# Patient Record
Sex: FEMALE | Race: WHITE
[De-identification: names, ages, dates, MRNs, and addresses within clinical notes are randomized per-mention and may not be internally consistent; named-entity substitution may affect disease eponyms.]

---

## 2019-03-21 ENCOUNTER — HOSPITAL ENCOUNTER (EMERGENCY)
Dept: HOSPITAL 89 - ER | Age: 64
Discharge: HOME | End: 2019-03-21
Payer: SELF-PAY

## 2019-03-21 VITALS — DIASTOLIC BLOOD PRESSURE: 75 MMHG | SYSTOLIC BLOOD PRESSURE: 97 MMHG

## 2019-03-21 DIAGNOSIS — K81.9: Primary | ICD-10-CM

## 2019-03-21 LAB — PLATELET COUNT, AUTOMATED: 512 K/UL (ref 150–450)

## 2019-03-21 PROCEDURE — 99284 EMERGENCY DEPT VISIT MOD MDM: CPT

## 2019-03-21 PROCEDURE — 82040 ASSAY OF SERUM ALBUMIN: CPT

## 2019-03-21 PROCEDURE — 84132 ASSAY OF SERUM POTASSIUM: CPT

## 2019-03-21 PROCEDURE — 82374 ASSAY BLOOD CARBON DIOXIDE: CPT

## 2019-03-21 PROCEDURE — 83690 ASSAY OF LIPASE: CPT

## 2019-03-21 PROCEDURE — 84075 ASSAY ALKALINE PHOSPHATASE: CPT

## 2019-03-21 PROCEDURE — 82565 ASSAY OF CREATININE: CPT

## 2019-03-21 PROCEDURE — 82435 ASSAY OF BLOOD CHLORIDE: CPT

## 2019-03-21 PROCEDURE — 84460 ALANINE AMINO (ALT) (SGPT): CPT

## 2019-03-21 PROCEDURE — 84520 ASSAY OF UREA NITROGEN: CPT

## 2019-03-21 PROCEDURE — 86677 HELICOBACTER PYLORI ANTIBODY: CPT

## 2019-03-21 PROCEDURE — 74177 CT ABD & PELVIS W/CONTRAST: CPT

## 2019-03-21 PROCEDURE — 93005 ELECTROCARDIOGRAM TRACING: CPT

## 2019-03-21 PROCEDURE — 85025 COMPLETE CBC W/AUTO DIFF WBC: CPT

## 2019-03-21 PROCEDURE — 96361 HYDRATE IV INFUSION ADD-ON: CPT

## 2019-03-21 PROCEDURE — 96365 THER/PROPH/DIAG IV INF INIT: CPT

## 2019-03-21 PROCEDURE — 84450 TRANSFERASE (AST) (SGOT): CPT

## 2019-03-21 PROCEDURE — 87088 URINE BACTERIA CULTURE: CPT

## 2019-03-21 PROCEDURE — 82247 BILIRUBIN TOTAL: CPT

## 2019-03-21 PROCEDURE — 82310 ASSAY OF CALCIUM: CPT

## 2019-03-21 PROCEDURE — 84155 ASSAY OF PROTEIN SERUM: CPT

## 2019-03-21 PROCEDURE — 82947 ASSAY GLUCOSE BLOOD QUANT: CPT

## 2019-03-21 PROCEDURE — 84295 ASSAY OF SERUM SODIUM: CPT

## 2019-03-21 PROCEDURE — 81001 URINALYSIS AUTO W/SCOPE: CPT

## 2019-03-21 PROCEDURE — 96375 TX/PRO/DX INJ NEW DRUG ADDON: CPT

## 2019-03-21 NOTE — RADIOLOGY IMAGING REPORT
FACILITY: [facility] - [place] 

 

PATIENT NAME: Conchita Johnson

: 1955

MR: [id number]

V: [id number]

EXAM DATE: 

ORDERING PHYSICIAN: BURKE JOHNSON

TECHNOLOGIST: 

 

Location: [facility]

Patient: Conchita Johnson

: 1955

MRN: [id number]

Visit/Account:[id number]

Date of Sevice:  3/21/2019

 

ACCESSION #: [id number]

 

EXAMINATION: CT abdomen and pelvis with IV contrast

 

HISTORY:  Abdominal pain.

 

TECHNIQUE:   Axial CT images of the abdomen and pelvis were obtained with IV contrast, with coronal a
nd sagittal 2D reconstructed images.

One of the following dose optimization techniques was utilized in the performance of this exam: Autom
ated exposure control; adjustment of the mA and/or kV according to the patient's size; or use of an i
terative  reconstruction technique.  Specific details can be referenced in the facility's radiology C
T exam operational policy.

 

Contrast:  90 mL of IV Isovue-370.

 

COMPARISON:  None.

 

FINDINGS:

Liver:  Negative.

 

Gallbladder and bile ducts:  The gallbladder is significantly distended measuring up to 5.4 cm in matt
meter and approximately 11.5 cm in length with wall thickening and enhancement and mild pericholecyst
ic stranding. CT appearance is suspicious for acute cholecystitis. No significant bile duct dilatatio
n by CT.

 

Spleen:  Negative.

 

Pancreas:  Negative.

 

Adrenal glands:  Negative.

 

Kidneys:  Negative. No hydronephrosis or urinary calculi.

 

Bowel and peritoneum:  The small bowel and colon are normal in caliber, without evidence of obstructi
on or any focal inflammatory process. Unremarkable appendix. No free fluid or free intraperitoneal ai
r. Small hiatal hernia.

 

Pelvic  structures:  Lobular uterus with likely fibroids.

 

Lymph node assessment:  Negative.

 

Vessels:  Mild vascular calcifications. Normal caliber abdominal aorta. The IVC is patent.

 

Musculoskeletal:   No acute osseous findings. Multilevel degenerative changes along the spine.

 

Body wall:  Negative.

 

Lung bases:  Negative.

 

IMPRESSION:

1. The gallbladder is distended with wall thickening and mild pericholecystic stranding, suspicious f
or acute cholecystitis.

2. No other acute intra-abdominal findings.

3. Normal appendix.

4. Likely uterine fibroids.

5. Small hiatal hernia.

 

 

Report Dictated By: Jae Moore MD at 3/21/2019 1:08 PM

 

Report E-Signed By: Jae Moore MD  at 3/21/2019 1:19 PM

 

WSN:[id number]

## 2019-03-21 NOTE — EKG
[place] [facility] [place] 

 

PATIENT NAME: CLAIR BURK

: [id number]

MR: [id number]

V: [id number]

EXAM DATE: 

ORDERING PHYSICIAN: BURKE JOHNSON

TECHNOLOGIST: 

 

Test Reason : ab pain

Blood Pressure : ***/*** mmHG

Vent. Rate : 078 BPM     Atrial Rate : 078 BPM

   P-R Int : 128 ms          QRS Dur : 076 ms

    QT Int : 388 ms       P-R-T Axes : 040 056 059 degrees

   QTc Int : 442 ms

 

Normal sinus rhythm

Septal infarct , age undetermined

Abnormal ECG

No previous ECGs available

Confirmed by CHEPE MANJARREZ ([id number]) on 3/21/2019 2:22:17 PM

 

Referred By:             Confirmed By:CHEPE MANJARREZ

## 2019-03-21 NOTE — ER REPORT
History and Physical


Time Seen By MD:  11:21


HPI/ROS


CHIEF COMPLAINT: Stomach and kidney pain





HISTORY OF PRESENT ILLNESS: Patient is a 63-year-old female who admits to no 


known medical history although she does not follow with a primary physician. She


states that for the last few weeks she's had abdominal pain as well as bilateral


flank pain. She thinks she may have "a kidney infection". She denies that she is


having any frequency of urination or burning with urination. He denies any 


significant past surgical history. She states that she just "feels ill". Patient


denies fevers or chills. She denies any chest pain or shortness of breath.





REVIEW OF SYSTEMS:


Constitutional: No fever, no chills.


Eyes: No discharge.


ENT: No sore throat. 


Cardiovascular: No chest pain, no palpitations.


Respiratory: No cough, no shortness of breath.


Gastrointestinal: Abdominal and flank pain with occasional nausea as well as 


anorexia


Genitourinary: No hematuria. Denies frequency of urination


Musculoskeletal: No back pain.


Skin: No rashes.


Neurological: No headache.


Allergies:  


Coded Allergies:  


     acetaminophen (Verified  Allergy, Mild, swelling in limbs, 3/21/19)


Uncoded Allergies:  


     wood grain alcohol (Allergy, Unknown, 3/21/19)


Home Meds


Active Scripts


Ondansetron Hcl (ZOFRAN) 4 Mg Tablet, 4 MG PO Q8H for Nausea, #15 TAB 0 Refills


   Prov:BURKE JOHNSON MD         3/21/19


Tramadol Hcl (TRAMADOL HCL) 50 Mg Tablet, 50 MG PO Q6H PRN for PAIN, #20 TAB 0 


Refills


   Prov:BURKE JOHNSON MD         3/21/19


Amoxicillin/Pot Clav 875-125 Mg Tab (AUGMENTIN 875-125 TABLET) 1 Each Tablet, 1 


TAB PO Q12H for 7 Days, #14 TAB 0 Refills


   Prov:BURKE JOHNSON MD         3/21/19


Past Medical/Surgical History


Patient reports no known past medical history but does not routinely follow with


a physician.


Constitutional





Vital Sign - Last 24 Hours








 3/21/19 3/21/19 3/21/19 3/21/19





 11:33 11:38 12:00 13:00


 


Temp  97.5  


 


Pulse  85 78 82


 


Resp  24 30 20


 


B/P (MAP) 158/125 (136) 158/125  166/96 (119)


 


Pulse Ox  94 95 91


 


O2 Delivery  Room Air  


 


    





 3/21/19 3/21/19 3/21/19 3/21/19





 13:30 14:00 14:30 15:00


 


Pulse 84 94 86 80


 


Resp 17 21 24 18


 


B/P (MAP) 161/94 (116) 156/98 (117) 157/92 (113) 179/106 (130)


 


Pulse Ox 90 91 93 94





 3/21/19   





 15:30   


 


Pulse 81   


 


Resp 23   


 


B/P (MAP) 97/75 (82)   


 


Pulse Ox 91   














Intake and Output   


 


 3/21/19 3/21/19 3/22/19





 15:00 23:00 07:00


 


Intake Total 1000 ml 100 ml 


 


Balance 1000 ml 100 ml 








Physical Exam


General/Constitutional: Patient is awake, alert, ill-appearing but nontoxic 


sitting upright for comfort


Head: Normocephalic and atraumatic.


Eyes: Conjunctival clear, Pupils are equal and reactive to light. Extraocular 


muscles are intact and symmetrical. Sclera are clear and anicteric.


Ears:External canals are clear. Tympanic membranes are clear with normal 


landmarks and light reflex.


Nares: No rhinorrhea or bleeding. Turbinates are pink and moist.


Oropharyngeal: Mucous membranes are moist. There is no pharyngeal erythema or 


exudate. There are no palatal petechiae. Uvula is midline and symmetrical. 


Neck: Supple, no adenopathy.


Cardiovascular: Heart is regular rate and rhythm without audible murmurs, rubs 


or gallops. 


Pulmonary: Lungs are clear to auscultation bilaterally. There are no wheezes, 


rales, or rhonchi. Chest rise is symmetrical


Abdomen: Patient is diffusely tender throughout abdomen bilateral flanks. No 


obvious peritoneal signs elicited


Extremities: No gross deformities, No peripheral cyanosis. Able to move all 4 


extremities.


Neuro: Alert and oriented X3, 


Skin: No rashes, skin is warm dry and well perfused.





Medical Decision Making


Data Points


Result Diagram:  


3/21/19 1151                                                                    


           3/21/19 1151





Laboratory





Hematology








Test


 3/21/19


11:51


 


Red Blood Count


 5.42 M/uL


(4.17-5.56)


 


Mean Corpuscular Volume


 87.4 fL


(80.0-96.0)


 


Mean Corpuscular Hemoglobin


 30.1 pg


(26.0-33.0)


 


Mean Corpuscular Hemoglobin


Concent 34.4 g/dL


(32.0-36.0)


 


Red Cell Distribution Width


 13.4 %


(11.5-14.5)


 


Mean Platelet Volume


 6.9 fL


(7.2-11.1)


 


Neutrophils (%) (Auto)


 77.7 %


(39.4-72.5)


 


Lymphocytes (%) (Auto)


 12.6 %


(17.6-49.6)


 


Monocytes (%) (Auto)


 8.2 %


(4.1-12.4)


 


Eosinophils (%) (Auto)


 1.1 %


(0.4-6.7)


 


Basophils (%) (Auto)


 0.4 %


(0.3-1.4)


 


Nucleated RBC Relative Count


(auto) 0.0 /100WBC 





 


Neutrophils # (Auto)


 8.3 K/uL


(2.0-7.4)


 


Lymphocytes # (Auto)


 1.4 K/uL


(1.3-3.6)


 


Monocytes # (Auto)


 0.9 K/uL


(0.3-1.0)


 


Eosinophils # (Auto)


 0.1 K/uL


(0.0-0.5)


 


Basophils # (Auto)


 0.0 K/uL


(0.0-0.1)


 


Nucleated RBC Absolute Count


(auto) 0.00 K/uL 





 


Peripheral Blood Smear Yes Y/N 


 


Urine Color Ida 


 


Urine Clarity Cloudy 


 


Urine pH


 5.0 pH


(4.8-9.5)


 


Urine Specific Gravity 1.021 


 


Urine Protein


 Negative mg/dL


(NEGATIVE)


 


Urine Glucose (UA)


 Negative mg/dL


(NEGATIVE)


 


Urine Ketones


 Negative mg/dL


(NEGATIVE)


 


Urine Blood


 Negative


(NEGATIVE)


 


Urine Nitrite


 Negative


(NEGATIVE)


 


Urine Bilirubin


 Negative


(NEGATIVE)


 


Urine Urobilinogen


 4.0 mg/dL


(0.2-1.9)


 


Urine Leukocyte Esterase


 Moderate


(NEGATIVE)


 


Urine RBC


 1 /HPF


(0-2/HPF)


 


Urine WBC


 8 /HPF


(0-5/HPF)


 


Urine Squamous Epithelial


Cells Many /LPF


(</=FEW)


 


Urine Transitional Epithelial


Cells Moderate /LPF


(NONE-FEW)


 


Urine Amorphous Crystals Few /HPF 


 


Urine Bacteria


 Few /HPF


(NONE-FEW)


 


Urine Mucus


 Few /HPF


(NONE-FEW)


 


Sodium Level


 141 mmol/L


(137-145)


 


Potassium Level


 3.8 mmol/L


(3.5-5.0)


 


Chloride Level


 106 mmol/L


()


 


Carbon Dioxide Level


 25 mmol/L


(22-31)


 


Blood Urea Nitrogen


 16 mg/dl


(7-18)


 


Creatinine


 0.80 mg/dl


(0.52-1.04)


 


Glomerular Filtration Rate


Calc > 60.0 





 


Random Glucose


 135 mg/dl


()


 


Calcium Level


 10.3 mg/dl


(8.4-10.2)


 


Total Bilirubin


 1.3 mg/dl


(0.2-1.3)


 


Aspartate Amino Transf


(AST/SGOT) 216 U/L (0-35) 





 


Alanine Aminotransferase


(ALT/SGPT) 119 U/L (0-56) 





 


Alkaline Phosphatase


 347 U/L


(0-126)


 


Total Protein


 8.5 g/dl


(6.3-8.2)


 


Albumin


 4.6 g/dl


(3.5-5.0)


 


Lipase


 69 U/L


()


 


Helicobacter pylori IgG


Antibody Negative


(NEGATIVE)








Chemistry








Test


 3/21/19


11:51


 


White Blood Count


 10.7 k/uL


(4.5-11.0)


 


Red Blood Count


 5.42 M/uL


(4.17-5.56)


 


Hemoglobin


 16.3 g/dL


(12.0-16.0)


 


Hematocrit


 47.3 %


(34.0-47.0)


 


Mean Corpuscular Volume


 87.4 fL


(80.0-96.0)


 


Mean Corpuscular Hemoglobin


 30.1 pg


(26.0-33.0)


 


Mean Corpuscular Hemoglobin


Concent 34.4 g/dL


(32.0-36.0)


 


Red Cell Distribution Width


 13.4 %


(11.5-14.5)


 


Platelet Count


 512 K/uL


(150-450)


 


Mean Platelet Volume


 6.9 fL


(7.2-11.1)


 


Neutrophils (%) (Auto)


 77.7 %


(39.4-72.5)


 


Lymphocytes (%) (Auto)


 12.6 %


(17.6-49.6)


 


Monocytes (%) (Auto)


 8.2 %


(4.1-12.4)


 


Eosinophils (%) (Auto)


 1.1 %


(0.4-6.7)


 


Basophils (%) (Auto)


 0.4 %


(0.3-1.4)


 


Nucleated RBC Relative Count


(auto) 0.0 /100WBC 





 


Neutrophils # (Auto)


 8.3 K/uL


(2.0-7.4)


 


Lymphocytes # (Auto)


 1.4 K/uL


(1.3-3.6)


 


Monocytes # (Auto)


 0.9 K/uL


(0.3-1.0)


 


Eosinophils # (Auto)


 0.1 K/uL


(0.0-0.5)


 


Basophils # (Auto)


 0.0 K/uL


(0.0-0.1)


 


Nucleated RBC Absolute Count


(auto) 0.00 K/uL 





 


Peripheral Blood Smear Yes Y/N 


 


Urine Color Ida 


 


Urine Clarity Cloudy 


 


Urine pH


 5.0 pH


(4.8-9.5)


 


Urine Specific Gravity 1.021 


 


Urine Protein


 Negative mg/dL


(NEGATIVE)


 


Urine Glucose (UA)


 Negative mg/dL


(NEGATIVE)


 


Urine Ketones


 Negative mg/dL


(NEGATIVE)


 


Urine Blood


 Negative


(NEGATIVE)


 


Urine Nitrite


 Negative


(NEGATIVE)


 


Urine Bilirubin


 Negative


(NEGATIVE)


 


Urine Urobilinogen


 4.0 mg/dL


(0.2-1.9)


 


Urine Leukocyte Esterase


 Moderate


(NEGATIVE)


 


Urine RBC


 1 /HPF


(0-2/HPF)


 


Urine WBC


 8 /HPF


(0-5/HPF)


 


Urine Squamous Epithelial


Cells Many /LPF


(</=FEW)


 


Urine Transitional Epithelial


Cells Moderate /LPF


(NONE-FEW)


 


Urine Amorphous Crystals Few /HPF 


 


Urine Bacteria


 Few /HPF


(NONE-FEW)


 


Urine Mucus


 Few /HPF


(NONE-FEW)


 


Glomerular Filtration Rate


Calc > 60.0 





 


Calcium Level


 10.3 mg/dl


(8.4-10.2)


 


Total Bilirubin


 1.3 mg/dl


(0.2-1.3)


 


Aspartate Amino Transf


(AST/SGOT) 216 U/L (0-35) 





 


Alanine Aminotransferase


(ALT/SGPT) 119 U/L (0-56) 





 


Alkaline Phosphatase


 347 U/L


(0-126)


 


Total Protein


 8.5 g/dl


(6.3-8.2)


 


Albumin


 4.6 g/dl


(3.5-5.0)


 


Lipase


 69 U/L


()


 


Helicobacter pylori IgG


Antibody Negative


(NEGATIVE)








Urinalysis








Test


 3/21/19


11:51


 


Urine Color Ida 


 


Urine Clarity Cloudy 


 


Urine pH


 5.0 pH


(4.8-9.5)


 


Urine Specific Gravity 1.021 


 


Urine Protein


 Negative mg/dL


(NEGATIVE)


 


Urine Glucose (UA)


 Negative mg/dL


(NEGATIVE)


 


Urine Ketones


 Negative mg/dL


(NEGATIVE)


 


Urine Blood


 Negative


(NEGATIVE)


 


Urine Nitrite


 Negative


(NEGATIVE)


 


Urine Bilirubin


 Negative


(NEGATIVE)


 


Urine Urobilinogen


 4.0 mg/dL


(0.2-1.9)


 


Urine Leukocyte Esterase


 Moderate


(NEGATIVE)


 


Urine RBC


 1 /HPF


(0-2/HPF)


 


Urine WBC


 8 /HPF


(0-5/HPF)


 


Urine Squamous Epithelial


Cells Many /LPF


(</=FEW)


 


Urine Transitional Epithelial


Cells Moderate /LPF


(NONE-FEW)


 


Urine Amorphous Crystals Few /HPF 


 


Urine Bacteria


 Few /HPF


(NONE-FEW)


 


Urine Mucus


 Few /HPF


(NONE-FEW)











EKG/Imaging


EKG Interpretation


EKG shows normal sinus rhythm no significant ST segment or T-wave abnormalities.


Monitor Interpretation:  Normal Sinus Rhythm


Imaging


FACILITY: [facility] 


 


PATIENT NAME: Conchita Johnson


: 1955


MR: [id number]


V: [id number]


EXAM DATE: 


ORDERING PHYSICIAN: BURKE JOHNSON


TECHNOLOGIST: 


 


Location: [facility]


Patient: Conchita Johnson


: 1955


MRN: [id number]


Visit/Account:[id number]


Date of Sevice:  3/21/2019


 


ACCESSION #: [id number]


 


EXAMINATION: CT abdomen and pelvis with IV contrast


 


HISTORY:  Abdominal pain.


 


TECHNIQUE:   Axial CT images of the abdomen and pelvis were obtained with IV 


contrast, with coronal and sagittal 2D reconstructed images.


One of the following dose optimization techniques was utilized in the performan


ce of this exam: Automated exposure control; adjustment of the mA and/or kV 


according to the patient's size; or use of an iterative  reconstruction 


technique.  Specific details can be referenced in the facility's radiology CT 


exam operational policy.


 


Contrast:  90 mL of IV Isovue-370.


 


COMPARISON:  None.


 


FINDINGS:


Liver:  Negative.


 


Gallbladder and bile ducts:  The gallbladder is significantly distended 


measuring up to 5.4 cm in diameter and approximately 11.5 cm in length with wall


thickening and enhancement and mild pericholecystic stranding. CT appearance is 


suspicious for acute cholecystitis. No significant bile duct dilatation by CT.


 


Spleen:  Negative.


 


Pancreas:  Negative.


 


Adrenal glands:  Negative.


 


Kidneys:  Negative. No hydronephrosis or urinary calculi.


 


Bowel and peritoneum:  The small bowel and colon are normal in caliber, without 


evidence of obstruction or any focal inflammatory process. Unremarkable 


appendix. No free fluid or free intraperitoneal air. Small hiatal hernia.


 


Pelvic  structures:  Lobular uterus with likely fibroids.


 


Lymph node assessment:  Negative.


 


Vessels:  Mild vascular calcifications. Normal caliber abdominal aorta. The IVC 


is patent.


 


Musculoskeletal:   No acute osseous findings. Multilevel degenerative changes 


along the spine.


 


Body wall:  Negative.


 


Lung bases:  Negative.


 


IMPRESSION:


1. The gallbladder is distended with wall thickening and mild pericholecystic 


stranding, suspicious for acute cholecystitis.


2. No other acute intra-abdominal findings.


3. Normal appendix.


4. Likely uterine fibroids.


5. Small hiatal hernia.


 


 


Report Dictated By: Jae Moore MD at 3/21/2019 1:08 PM


 


Report E-Signed By: Jae Moore MD  at 3/21/2019 1:19 PM


 


WSN:[id number]





ED Course/Re-evaluation


Clinical Indication for ER IV:  Hydration, IV Access


ED Course


Plan at this time will be abdominal workup with CT scan will also obtain 


urinalysis and urine culture. We'll give IV Zofran and IV Toradol for pain.





 2019 12:13:01 pm patient still having some discomfort. Offered additional


pain medicine patient wants to wait at this time.








 2019 1:41:31 pm ED scan consistent with acute cholecystitis without 


evidence of cholelithiasis. I spoke with Dr. Seaman who is on-call for general 


surgery today he will come down to evaluate the patient.





 2019 2:32:36 pm patient seen in consultation in the emergency room by Dr. Seaman; decision was made that the patient will receive IV antibiotics and now 


will be discharged home that she can make arrangements to take care of her 


animals understanding that if her symptoms worsen at any time she will come back


to the emergency department for reevaluation. I will send her home with 7 days 


of Augmentin pain medication and nausea medicine. Patient voiced the 


understanding and importance of these instructions.


Decision to Disposition Date:  Mar 21, 2019


Decision to Disposition Time:  14:34





Depart


Departure


Latest Vital Signs





Vital Signs








  Date Time  Temp Pulse Resp B/P (MAP) Pulse Ox O2 Delivery O2 Flow Rate FiO2


 


3/21/19 15:30  81 23 97/75 (82) 91   


 


3/21/19 11:38 97.5     Room Air  








Impression:  


   Primary Impression:  


   Cholecystitis


Condition:  Improved


Disposition:  HOME OR SELF-CARE


New Scripts


Ondansetron Hcl (ZOFRAN) 4 Mg Tablet


4 MG PO Q8H for Nausea, #15 TAB 0 Refills


   Prov: BURKE JOHNSON MD         3/21/19 


Tramadol Hcl (TRAMADOL HCL) 50 Mg Tablet


50 MG PO Q6H PRN for PAIN, #20 TAB 0 Refills


   Prov: BURKE JOHNSON MD         3/21/19 


Amoxicillin/Pot Clav 875-125 Mg Tab (AUGMENTIN 875-125 TABLET) 1 Each Tablet


1 TAB PO Q12H for 7 Days, #14 TAB 0 Refills


   Prov: BURKE JOHNSON MD         3/21/19


Patient Instructions:  Cholecystitis (ED)





Additional Instructions:  


Return to the emergency department immediately if her pain is uncontrolled. 


Developed a fever of 101.5 or higher level of persistent vomiting, otherwise the


office of Dr. Seaman will call you tomorrow to schedule an appointment to have 


her gallbladder removed.











BURKE JOHNSON MD             Mar 21, 2019 11:22

## 2019-03-22 NOTE — GENERAL SURGERY CONSULTATION
History of Present Illness


Reason for Consult


abd pain


Chief Complaint


abd pain


History of Present Illness


delayed note (pt seen during er visit 3/21/19).





64 yo f with bilat flank pain and ruq pain that began 2 wks ago. she was feeling


better, then the pain returned during the last day. she had vomiting a couple 


wks ago. pain is worse with fatty/greasy food. +chills.





pmh/psh: denies, but admits she has not been evaluated by a doctor recently.


social hx: +cigs


fam hx: dm





History


Home Meds


Active Scripts


Ondansetron Hcl (ZOFRAN) 4 Mg Tablet, 4 MG PO Q8H for Nausea, #15 TAB 0 Refills


   Prov:BURKE JOHNSON MD         3/21/19


Tramadol Hcl (TRAMADOL HCL) 50 Mg Tablet, 50 MG PO Q6H PRN for PAIN, #20 TAB 0 


Refills


   Prov:BURKE JOHNSON MD         3/21/19


Amoxicillin/Pot Clav 875-125 Mg Tab (AUGMENTIN 875-125 TABLET) 1 Each Tablet, 1 


TAB PO Q12H for 7 Days, #14 TAB 0 Refills


   Prov:BURKE JOHNSON MD         3/21/19


Allergies:  


Coded Allergies:  


     acetaminophen (Verified  Allergy, Mild, swelling in limbs, 3/21/19)


Uncoded Allergies:  


     wood grain alcohol (Allergy, Unknown, 3/21/19)





Review of Systems


Constitutional:  Other (10 pt ros neg except per hpi)





Exam


Vital Signs





Vital Signs








  Date Time  Temp Pulse Resp B/P (MAP) Pulse Ox O2 Delivery O2 Flow Rate FiO2


 


3/21/19 15:30  81 23 97/75 (82) 91   


 


3/21/19 11:38 97.5     Room Air  








General Appearance:  Alert, Awake, No Acute Distress


Neuro:  No Gross deficits


Eyes:  Other (per, eomi)


ENT:  Moist Mucous Membranes


Neck:  No Masses


Cardiovascular:  Other (reg rate)


Respiratory:  No Respiratory Distress


GI:  Other (morbidly obese, ruq ttp)


Extremities:  Other (no ptting edema but some swelling of lower extremities)


Integumentary:  Skin Intact without Lesion / Mass


Psych:  Alert & Oriented X3, Appropriate Mood & Affect





Medical Decision Making


Data Points


Result Diagram:  


3/21/19 1151                                                                    


           3/21/19 1151








Assessment and Plan


Problems:  


(1) Cholecystitis


Status:  Acute


Assessment & Plan:  acute cholecystitis





i discussed options with pt. i offered to admit her and perform lap carol 


3/22/19. she has animals she needs to take care. she chose to leave. will give 


abx. discussed diet. will plan lap carol for next week. 








Venous Thromboembolism


Antithrombotics


Is Pt On Any Antithrombotics?:  No











JAYNE DONALD                  Mar 22, 2019 14:41

## 2019-03-26 ENCOUNTER — HOSPITAL ENCOUNTER (OUTPATIENT)
Dept: HOSPITAL 89 - OR | Age: 64
Setting detail: OBSERVATION
LOS: 3 days | Discharge: ANOTHER HOSPITAL | End: 2019-03-29
Attending: SURGERY | Admitting: SURGERY
Payer: SELF-PAY

## 2019-03-26 VITALS — DIASTOLIC BLOOD PRESSURE: 90 MMHG | SYSTOLIC BLOOD PRESSURE: 141 MMHG

## 2019-03-26 VITALS — SYSTOLIC BLOOD PRESSURE: 120 MMHG | DIASTOLIC BLOOD PRESSURE: 82 MMHG

## 2019-03-26 VITALS
HEIGHT: 66 IN | BODY MASS INDEX: 42.11 KG/M2 | WEIGHT: 262 LBS | BODY MASS INDEX: 42.11 KG/M2 | WEIGHT: 262 LBS | HEIGHT: 66 IN

## 2019-03-26 VITALS — DIASTOLIC BLOOD PRESSURE: 82 MMHG | SYSTOLIC BLOOD PRESSURE: 125 MMHG

## 2019-03-26 VITALS — DIASTOLIC BLOOD PRESSURE: 80 MMHG | SYSTOLIC BLOOD PRESSURE: 127 MMHG

## 2019-03-26 VITALS — DIASTOLIC BLOOD PRESSURE: 77 MMHG | SYSTOLIC BLOOD PRESSURE: 116 MMHG

## 2019-03-26 DIAGNOSIS — K81.0: Primary | ICD-10-CM

## 2019-03-26 PROCEDURE — 84450 TRANSFERASE (AST) (SGOT): CPT

## 2019-03-26 PROCEDURE — 84075 ASSAY ALKALINE PHOSPHATASE: CPT

## 2019-03-26 PROCEDURE — 82040 ASSAY OF SERUM ALBUMIN: CPT

## 2019-03-26 PROCEDURE — 82947 ASSAY GLUCOSE BLOOD QUANT: CPT

## 2019-03-26 PROCEDURE — 82310 ASSAY OF CALCIUM: CPT

## 2019-03-26 PROCEDURE — 84295 ASSAY OF SERUM SODIUM: CPT

## 2019-03-26 PROCEDURE — 76705 ECHO EXAM OF ABDOMEN: CPT

## 2019-03-26 PROCEDURE — 82374 ASSAY BLOOD CARBON DIOXIDE: CPT

## 2019-03-26 PROCEDURE — 47562 LAPAROSCOPIC CHOLECYSTECTOMY: CPT

## 2019-03-26 PROCEDURE — 74300 X-RAY BILE DUCTS/PANCREAS: CPT

## 2019-03-26 PROCEDURE — 82247 BILIRUBIN TOTAL: CPT

## 2019-03-26 PROCEDURE — 82565 ASSAY OF CREATININE: CPT

## 2019-03-26 PROCEDURE — 88304 TISSUE EXAM BY PATHOLOGIST: CPT

## 2019-03-26 PROCEDURE — 96372 THER/PROPH/DIAG INJ SC/IM: CPT

## 2019-03-26 PROCEDURE — 85025 COMPLETE CBC W/AUTO DIFF WBC: CPT

## 2019-03-26 PROCEDURE — 84155 ASSAY OF PROTEIN SERUM: CPT

## 2019-03-26 PROCEDURE — 82435 ASSAY OF BLOOD CHLORIDE: CPT

## 2019-03-26 PROCEDURE — 36415 COLL VENOUS BLD VENIPUNCTURE: CPT

## 2019-03-26 PROCEDURE — 84460 ALANINE AMINO (ALT) (SGPT): CPT

## 2019-03-26 PROCEDURE — 83690 ASSAY OF LIPASE: CPT

## 2019-03-26 PROCEDURE — 84132 ASSAY OF SERUM POTASSIUM: CPT

## 2019-03-26 PROCEDURE — 84520 ASSAY OF UREA NITROGEN: CPT

## 2019-03-26 RX ADMIN — SODIUM CHLORIDE, SODIUM ACETATE ANHYDROUS, SODIUM GLUCONATE, POTASSIUM CHLORIDE, AND MAGNESIUM CHLORIDE PRN MLS/HR: 526; 222; 502; 37; 30 INJECTION, SOLUTION INTRAVENOUS at 10:25

## 2019-03-26 RX ADMIN — THIAMINE HYDROCHLORIDE SCH MLS/HR: 100 INJECTION, SOLUTION INTRAMUSCULAR; INTRAVENOUS at 23:30

## 2019-03-26 RX ADMIN — PIPERACILLIN AND TAZOBACTAM SCH MLS/HR: 4; .5 INJECTION, POWDER, LYOPHILIZED, FOR SOLUTION INTRAVENOUS; PARENTERAL at 20:26

## 2019-03-26 RX ADMIN — ENOXAPARIN SODIUM SCH MG: 100 INJECTION SUBCUTANEOUS at 18:08

## 2019-03-26 RX ADMIN — HYDROMORPHONE HYDROCHLORIDE PRN MG: 2 INJECTION, SOLUTION INTRAMUSCULAR; INTRAVENOUS; SUBCUTANEOUS at 19:28

## 2019-03-26 NOTE — RADIOLOGY IMAGING REPORT
FACILITY: [facility] [place] 

 

PATIENT NAME: Conchita Johnson

: 1955

MR: [id number]

V: [id number]

EXAM DATE: 

ORDERING PHYSICIAN: JAYNE DONALD

TECHNOLOGIST: 

 

[place] [facility] [place]: Conchita [place]

: 1955

MRN: [id number]

Visit/Account:[id number]

Date of Sevice:  3/26/2019

 

ACCESSION #: [id number]

 

Intraoperative cholangiogram

 

Indication: Cholecystectomy.

 

Findings: DOSE:  Air kerma  was  32.3     mGy.

Intraoperative fluoroscopic images are submitted during cannulation of the cystic duct.  Filling of t
he intra and extra hepatic biliary system is noted.  Spillage is noted to the duodenum.  Filling defe
cts are noted near the ampulla which may represent stones.  Correlate with intraoperative note.

 

 

IMPRESSION:

1. Unremarkable intraoperative cholangiogram.

 

Report Dictated By: Reggie Jimenes MD at 3/26/2019 3:39 PM

 

Report E-Signed By: Reggie Jimenes MD  at 3/26/2019 3:40 PM

 

WSN:[id number]

## 2019-03-26 NOTE — POST OPERATIVE PROGRESS NOTE
Post Operative Progress Note


Date:  Mar 26, 2019


Time:  16:58


Surgeon:  


dr. montrell dobson


Assistant:  


none


Anesthesia:  


gen, local


dr. ayers


Pre-Op Diagnosis:  


acute cholecystitis


Post-Op Diagnosis:  


same


Findings:  


acute calculus cholecystitis


Procedure(s):  


lap carol, ioc


Specimen Removed:(May be N/A):  


gb


Complications:  


none


Fluids:  


iv crystalloid


Estimated Blood Loss:  


<50 ml











JAYNE DOBSON                  Mar 26, 2019 16:59

## 2019-03-27 VITALS — SYSTOLIC BLOOD PRESSURE: 129 MMHG | DIASTOLIC BLOOD PRESSURE: 89 MMHG

## 2019-03-27 VITALS — DIASTOLIC BLOOD PRESSURE: 82 MMHG | SYSTOLIC BLOOD PRESSURE: 130 MMHG

## 2019-03-27 VITALS — DIASTOLIC BLOOD PRESSURE: 91 MMHG | SYSTOLIC BLOOD PRESSURE: 140 MMHG

## 2019-03-27 VITALS — DIASTOLIC BLOOD PRESSURE: 82 MMHG | SYSTOLIC BLOOD PRESSURE: 129 MMHG

## 2019-03-27 VITALS — SYSTOLIC BLOOD PRESSURE: 122 MMHG | DIASTOLIC BLOOD PRESSURE: 81 MMHG

## 2019-03-27 VITALS — SYSTOLIC BLOOD PRESSURE: 138 MMHG | DIASTOLIC BLOOD PRESSURE: 86 MMHG

## 2019-03-27 VITALS — SYSTOLIC BLOOD PRESSURE: 130 MMHG | DIASTOLIC BLOOD PRESSURE: 87 MMHG

## 2019-03-27 VITALS — DIASTOLIC BLOOD PRESSURE: 78 MMHG | SYSTOLIC BLOOD PRESSURE: 126 MMHG

## 2019-03-27 LAB — PLATELET COUNT, AUTOMATED: 370 K/UL (ref 150–450)

## 2019-03-27 RX ADMIN — PIPERACILLIN AND TAZOBACTAM SCH MLS/HR: 4; .5 INJECTION, POWDER, LYOPHILIZED, FOR SOLUTION INTRAVENOUS; PARENTERAL at 20:09

## 2019-03-27 RX ADMIN — THIAMINE HYDROCHLORIDE SCH MLS/HR: 100 INJECTION, SOLUTION INTRAMUSCULAR; INTRAVENOUS at 09:13

## 2019-03-27 RX ADMIN — ENOXAPARIN SODIUM SCH MG: 100 INJECTION SUBCUTANEOUS at 17:40

## 2019-03-27 RX ADMIN — PIPERACILLIN AND TAZOBACTAM SCH MLS/HR: 4; .5 INJECTION, POWDER, LYOPHILIZED, FOR SOLUTION INTRAVENOUS; PARENTERAL at 03:53

## 2019-03-27 RX ADMIN — HYDROMORPHONE HYDROCHLORIDE PRN MG: 2 INJECTION, SOLUTION INTRAMUSCULAR; INTRAVENOUS; SUBCUTANEOUS at 17:39

## 2019-03-27 RX ADMIN — PIPERACILLIN AND TAZOBACTAM SCH MLS/HR: 4; .5 INJECTION, POWDER, LYOPHILIZED, FOR SOLUTION INTRAVENOUS; PARENTERAL at 12:38

## 2019-03-27 RX ADMIN — HYDROMORPHONE HYDROCHLORIDE PRN MG: 2 INJECTION, SOLUTION INTRAMUSCULAR; INTRAVENOUS; SUBCUTANEOUS at 12:39

## 2019-03-27 RX ADMIN — THIAMINE HYDROCHLORIDE SCH MLS/HR: 100 INJECTION, SOLUTION INTRAMUSCULAR; INTRAVENOUS at 08:25

## 2019-03-27 NOTE — OPERATIVE REPORT 1
EVENT DATE: March 26, 2019

SURGEON: Jovan Seaman MD 

ANESTHESIOLOGIST: Spenser Madrid MD 

ANESTHESIA: General with local.

ASSISTANT: None.





PREOPERATIVE DIAGNOSIS  

Acute cholecystitis.



POSTOPERATIVE DIAGNOSIS 

Acute cholecystitis.



PROCEDURE PERFORMED 

Robotic cholecystectomy and laparoscopic intraoperative cholangiogram.



FLUIDS

IV crystalloids.



ESTIMATED BLOOD LOSS

Less than 15 mL. 



SPECIMENS

Gallbladder.



COMPLICATIONS

None.



INDICATIONS

This is a 63-year old female with right upper quadrant abdominal pain for 

approximately two weeks.  She presented to the emergency department a few days 

ago. She was diagnosed with acute cholecystitis.  She preferred to go home and 

take care of some things at home prior to surgery.  On physical exam, patient 

was jaundiced the day of surgery.  Her abdomen was soft.  She is morbidly obese.

Risks and benefits of the procedure were explained and consent was signed.  



PROCEDURE

The patient was taken to the operating room and placed in the supine position.  

General anesthesia was administered per the Anesthesia team.  Patient was 

prepped and draped in normal sterile fashion.  Local analgesia was injected 

below the left costal margin and a 5 mm port was used to enter the abdomen with 

OptiView technique.  Pneumoperitoneum was achieved. An 8 mm left sided port was 

placed.  This would later be exchanged for a 12 mm port.  Two right sided 8 mm 

ports were placed.  Later, a 5 mm right upper quadrant port will be placed for 

the cholangiogram.  The robot was docked.  Prior to docking, the gallbladder was

aspirated under laparoscopic vision.  The gallbladder was inflamed and omental 

fat was stuck to the gallbladder, consistent with acute cholecystitis.  The 

omental fat was carefully taken down bluntly and with hook cautery.  Dissection 

was tedious.  It was performed very meticulously.  The fundus of the gallbladder

was retracted superiorly and laterally during this time.  The infundibulum was 

grasped and retracted.  Electrocautery was used to free the cystic duct and 

cystic artery of surrounding tissue.  Both structures were seen going directly 

to the gallbladder.  Three clips were placed on the cystic artery and divided 

sharpy between the distal two clips. Alcocer catheter was used to perform the 

cholangiogram.  This was done laparoscopically. Cholangiogram initially did not 

show spill into the duodenum.  However, it eventually did show spill into the 

duodenum after subsequent injection of the dye.  The common bile duct and right 

and left hepatic ducts were well visualized as was the cystic duct.  Alcocer clap 

and needle were removed.  The robot was re-docked.  Blue-load with robotic 

linear stapler was used to divide the gallbladder at the junction of the cystic 

duct and neck of the gallbladder.  The gallbladder was then taken off the liver 

bed with electrocautery. It, along with stones, were removed with an Endo Catch 

bag through the 12 mm port site.  The right upper quadrant was thoroughly 

irrigated and suctioned.  Irrigant returned clear.  Hemostasis was assured.  

Staple lines confirmed to be intact.  Robot was undocked.  Fascia closure device

with 0 Vicryl stitch was used to close the fascia of the large port site.  Ports

were removed under direct vision.  Hemostasis was assured.  Final port was 

removed after pneumoperitoneum was relieved.  All skin incisions were closed 

with 4-0 Monocryl subcuticular stitches.  More local analgesia was injected.  

Appropriate dressings were applied.  The patient tolerated the procedure well 

and there were no complications.





[facility] Center[facility]KHI

## 2019-03-27 NOTE — GENERAL SURGERY PROGRESS NOTE
Subjective


Progress Notes


Subjective


s/p lap carol. doing well. some pain.





Physical Exam





Vital Signs








  Date Time  Temp Pulse Resp B/P (MAP) Pulse Ox O2 Delivery O2 Flow Rate FiO2


 


3/27/19 08:00     89   


 


3/27/19 08:00 98.1 83 16 126/78 (94)  Room Air  


 


3/27/19 03:45       0.5 














Intake and Output 


 


 3/27/19





 06:59


 


Intake Total 5550 ml


 


Output Total 110 ml


 


Balance 5440 ml


 


 


 


Intake Oral 50 ml


 


IV Total 2800 ml


 


Other 2700 ml


 


Output Urine Total 100 ml


 


Estimated Blood Loss 10 ml


 


# Voids 1








General Appearance:  No Acute Distress


Cardiovascular:  Other (reg rate)


GI:  Other (soft)


Result Diagram:  


3/27/19 0551                                                                    


           3/27/19 0551








Assessment and Plan


Problems:  


(1) Cholecystitis


Status:  Acute


Assessment & Plan:  3/27: s/p lap carol. doing well. adat. ambulate. cont abx, 


lovenox. 








Exam


Sepsis Risk:  No Definite Risk











JAYNE DONALD                  Mar 27, 2019 10:33

## 2019-03-28 VITALS — DIASTOLIC BLOOD PRESSURE: 89 MMHG | SYSTOLIC BLOOD PRESSURE: 143 MMHG

## 2019-03-28 VITALS — DIASTOLIC BLOOD PRESSURE: 98 MMHG | SYSTOLIC BLOOD PRESSURE: 140 MMHG

## 2019-03-28 VITALS — DIASTOLIC BLOOD PRESSURE: 100 MMHG | SYSTOLIC BLOOD PRESSURE: 150 MMHG

## 2019-03-28 VITALS — DIASTOLIC BLOOD PRESSURE: 95 MMHG | SYSTOLIC BLOOD PRESSURE: 130 MMHG

## 2019-03-28 VITALS — DIASTOLIC BLOOD PRESSURE: 86 MMHG | SYSTOLIC BLOOD PRESSURE: 125 MMHG

## 2019-03-28 LAB — PLATELET COUNT, AUTOMATED: 361 K/UL (ref 150–450)

## 2019-03-28 RX ADMIN — ENOXAPARIN SODIUM SCH MG: 100 INJECTION SUBCUTANEOUS at 18:24

## 2019-03-28 RX ADMIN — PIPERACILLIN AND TAZOBACTAM SCH MLS/HR: 4; .5 INJECTION, POWDER, LYOPHILIZED, FOR SOLUTION INTRAVENOUS; PARENTERAL at 03:47

## 2019-03-28 RX ADMIN — PIPERACILLIN AND TAZOBACTAM SCH MLS/HR: 4; .5 INJECTION, POWDER, LYOPHILIZED, FOR SOLUTION INTRAVENOUS; PARENTERAL at 19:53

## 2019-03-28 RX ADMIN — PIPERACILLIN AND TAZOBACTAM SCH MLS/HR: 4; .5 INJECTION, POWDER, LYOPHILIZED, FOR SOLUTION INTRAVENOUS; PARENTERAL at 11:46

## 2019-03-28 RX ADMIN — THIAMINE HYDROCHLORIDE SCH MLS/HR: 100 INJECTION, SOLUTION INTRAMUSCULAR; INTRAVENOUS at 03:46

## 2019-03-28 NOTE — RADIOLOGY IMAGING REPORT
FACILITY: [facility] 

 

PATIENT NAME: Conchita Johnson

: 1955

MR: [id number]

V: [id number]

EXAM DATE: 

ORDERING PHYSICIAN: JAYNE DONALD

TECHNOLOGIST: 

 

[place] [facility] [place]

Patient: Conchita Johnson

: 1955

MRN: [id number]

Visit/Account:[id number]

Date of Sevice:  3/28/2019

 

ACCESSION #: [id number]

 

LIVER

 

HISTORY:  elevated LFTs - s/p cholycystectomy

LIVER

 

EXAMINATION:   Abdominal ultrasound limited

 

Study somewhat limited due to body habitus and overlying gas

 

COMPARISON STUDIES:   none

 

FINDINGS:

Gallbladder: Absent

Liver: Somewhat heterogenous liver parenchyma.  No focal liver lesions.  Normal hepatopedal flow.

Common duct: normal.   Maximal size is 7.3 mm.

Pancreas: Pancreatic head grossly normal.  Most of pancreas obscured from overlying bowel gas.

Right Kidney:  10.5 x 4.8 x 5.7 cm.

Upper abdominal aorta and IVC:  negative

Ascites: none

 

IMPRESSION:

1.  Status post cholecystectomy.  No obvious free fluid or acute intra-abdominal pathology..

2.  Somewhat heterogenous appearance to the liver parenchyma on some of the images.  Technically diff
icult exam.

 

Report Dictated By: Miguel Severino MD at 3/28/2019 4:31 PM

 

Report E-Signed By: Miguel Severino MD  at 3/28/2019 4:37 PM

 

WSN:[id number]

## 2019-03-28 NOTE — GENERAL SURGERY PROGRESS NOTE
Subjective


Progress Notes


Subjective


some abd pain and c/o phlegm in throat. jie adina.





Physical Exam





Vital Signs








  Date Time  Temp Pulse Resp B/P (MAP) Pulse Ox O2 Delivery O2 Flow Rate FiO2


 


3/28/19 03:47 97.5 88 16 150/100 (117) 93 Nasal Cannula 0.5 














Intake and Output 


 


 3/28/19





 07:00


 


Intake Total 1687 ml


 


Balance 1687 ml


 


 


 


Intake Oral 360 ml


 


IV Total 1327 ml


 


# Voids 2








General Appearance:  No Acute Distress


Cardiovascular:  Other (reg rate)


Respiratory:  No Respiratory Distress


GI:  Other (abd soft)


Result Diagram:  


3/28/19 0612                                                                    


           3/28/19 0612








Assessment and Plan


Problems:  


(1) Cholecystitis


Status:  Acute


Assessment & Plan:  3/27: s/p lap carol. doing well. adat. ambulate. cont abx, 


lovenox. 





3/28/19: doing fine. bili still elevated. mrcp. lovenox. ambulate. 








Exam


Sepsis Risk:  No Definite Risk











JAYNE DONALD                  Mar 28, 2019 07:33

## 2019-03-28 NOTE — ANTIMICROBIAL STEWARDSHIP
Antimicrobial Time Out


Antimicrobial Stewardship


MD Service:  Other (Dr. Seaman)


Indications:  Other (s/p lap carol)


Antimicrobial Used


Zosyn 4.5 gm  IVPB q 8 hours


Culture Results:  N/A





Eligible for PO Conversion


Eligable for PO Conversion:  Yes











AMY GAMINO                Mar 28, 2019 16:36

## 2019-03-29 ENCOUNTER — HOSPITAL ENCOUNTER (OUTPATIENT)
Dept: HOSPITAL 89 - AMB | Age: 64
End: 2019-03-29
Payer: SELF-PAY

## 2019-03-29 VITALS — DIASTOLIC BLOOD PRESSURE: 79 MMHG | SYSTOLIC BLOOD PRESSURE: 128 MMHG

## 2019-03-29 VITALS — SYSTOLIC BLOOD PRESSURE: 155 MMHG | DIASTOLIC BLOOD PRESSURE: 98 MMHG

## 2019-03-29 VITALS — DIASTOLIC BLOOD PRESSURE: 92 MMHG | SYSTOLIC BLOOD PRESSURE: 150 MMHG

## 2019-03-29 VITALS — SYSTOLIC BLOOD PRESSURE: 141 MMHG | DIASTOLIC BLOOD PRESSURE: 87 MMHG

## 2019-03-29 VITALS — SYSTOLIC BLOOD PRESSURE: 157 MMHG | DIASTOLIC BLOOD PRESSURE: 103 MMHG

## 2019-03-29 VITALS — DIASTOLIC BLOOD PRESSURE: 100 MMHG | SYSTOLIC BLOOD PRESSURE: 148 MMHG

## 2019-03-29 VITALS — DIASTOLIC BLOOD PRESSURE: 90 MMHG | SYSTOLIC BLOOD PRESSURE: 157 MMHG

## 2019-03-29 VITALS — BODY MASS INDEX: 42.28 KG/M2

## 2019-03-29 DIAGNOSIS — R17: ICD-10-CM

## 2019-03-29 DIAGNOSIS — K72.90: Primary | ICD-10-CM

## 2019-03-29 LAB — PLATELET COUNT, AUTOMATED: 419 K/UL (ref 150–450)

## 2019-03-29 RX ADMIN — PIPERACILLIN AND TAZOBACTAM SCH MLS/HR: 4; .5 INJECTION, POWDER, LYOPHILIZED, FOR SOLUTION INTRAVENOUS; PARENTERAL at 11:51

## 2019-03-29 RX ADMIN — PIPERACILLIN AND TAZOBACTAM SCH MLS/HR: 4; .5 INJECTION, POWDER, LYOPHILIZED, FOR SOLUTION INTRAVENOUS; PARENTERAL at 04:17
